# Patient Record
Sex: MALE | Race: OTHER | NOT HISPANIC OR LATINO | Employment: OTHER | URBAN - METROPOLITAN AREA
[De-identification: names, ages, dates, MRNs, and addresses within clinical notes are randomized per-mention and may not be internally consistent; named-entity substitution may affect disease eponyms.]

---

## 2023-11-02 ENCOUNTER — OFFICE VISIT (OUTPATIENT)
Dept: OPHTHALMOLOGY | Facility: CLINIC | Age: 83
End: 2023-11-02
Payer: COMMERCIAL

## 2023-11-02 DIAGNOSIS — Z96.1 PSEUDOPHAKIA OF BOTH EYES: ICD-10-CM

## 2023-11-02 DIAGNOSIS — H43.813 POSTERIOR VITREOUS DETACHMENT OF BOTH EYES: ICD-10-CM

## 2023-11-02 DIAGNOSIS — H52.4 PRESBYOPIA: ICD-10-CM

## 2023-11-02 DIAGNOSIS — H52.223 REGULAR ASTIGMATISM OF BOTH EYES: ICD-10-CM

## 2023-11-02 DIAGNOSIS — H26.493 PCO (POSTERIOR CAPSULAR OPACIFICATION), BILATERAL: ICD-10-CM

## 2023-11-02 DIAGNOSIS — H52.13 MYOPIA OF BOTH EYES: Primary | ICD-10-CM

## 2023-11-02 PROCEDURE — 92015 DETERMINE REFRACTIVE STATE: CPT | Performed by: OPTOMETRIST

## 2023-11-02 PROCEDURE — 92004 COMPRE OPH EXAM NEW PT 1/>: CPT | Performed by: OPTOMETRIST

## 2023-11-02 RX ORDER — ATENOLOL 50 MG/1
TABLET ORAL
COMMUNITY
Start: 2023-01-23

## 2023-11-02 RX ORDER — LOSARTAN POTASSIUM 100 MG/1
TABLET ORAL
COMMUNITY
Start: 2023-01-23

## 2023-11-02 RX ORDER — APIXABAN 5 MG/1
TABLET, FILM COATED ORAL
COMMUNITY
Start: 2023-01-23

## 2023-11-02 RX ORDER — SIMVASTATIN 40 MG/1
TABLET, FILM COATED ORAL
COMMUNITY
Start: 2023-05-01

## 2023-11-02 RX ORDER — AMLODIPINE BESYLATE 5 MG/1
TABLET ORAL
COMMUNITY
Start: 2023-01-23

## 2023-11-02 ASSESSMENT — CUP TO DISC RATIO
OD_RATIO: 0.4
OS_RATIO: 0.5

## 2023-11-02 ASSESSMENT — REFRACTION_MANIFEST
OS_CYLINDER: -2.00
OS_SPHERE: -3.50
OD_CYLINDER: -2.25
OS_SPHERE: -3.25
OD_AXIS: 092
OD_SPHERE: -1.50
OD_AXIS: 087
OS_ADD: +2.50
OD_CYLINDER: -2.75
OD_SPHERE: -1.75
OD_ADD: +2.50
METHOD_AUTOREFRACTION: 1
OS_AXIS: 075
OS_AXIS: 066
OS_CYLINDER: -1.25

## 2023-11-02 ASSESSMENT — CONF VISUAL FIELD
OS_INFERIOR_NASAL_RESTRICTION: 0
OS_SUPERIOR_NASAL_RESTRICTION: 0
OD_INFERIOR_NASAL_RESTRICTION: 0
OS_INFERIOR_TEMPORAL_RESTRICTION: 0
OS_SUPERIOR_TEMPORAL_RESTRICTION: 0
METHOD: COUNTING FINGERS
OD_SUPERIOR_NASAL_RESTRICTION: 0
OD_INFERIOR_TEMPORAL_RESTRICTION: 0
OD_SUPERIOR_TEMPORAL_RESTRICTION: 0
OS_NORMAL: 1
OD_NORMAL: 1

## 2023-11-02 ASSESSMENT — ENCOUNTER SYMPTOMS
HEMATOLOGIC/LYMPHATIC NEGATIVE: 0
ENDOCRINE NEGATIVE: 0
CARDIOVASCULAR NEGATIVE: 0
EYES NEGATIVE: 1
MUSCULOSKELETAL NEGATIVE: 0
PSYCHIATRIC NEGATIVE: 0
ALLERGIC/IMMUNOLOGIC NEGATIVE: 0
GASTROINTESTINAL NEGATIVE: 0
CONSTITUTIONAL NEGATIVE: 0
RESPIRATORY NEGATIVE: 0
NEUROLOGICAL NEGATIVE: 0

## 2023-11-02 ASSESSMENT — TONOMETRY
OS_IOP_MMHG: 11
IOP_METHOD: GOLDMANN APPLANATION
OD_IOP_MMHG: 11

## 2023-11-02 ASSESSMENT — VISUAL ACUITY
OS_CC+: -2
OD_PH_CC+: -2
OD_PH_CC: 20/30
OS_CC: 20/30
OS_PH_CC: 20/25
OS_PH_CC+: -2
OD_CC: 20/50
CORRECTION_TYPE: GLASSES
OD_CC+: +1
METHOD: SNELLEN - LINEAR

## 2023-11-02 ASSESSMENT — EXTERNAL EXAM - LEFT EYE: OS_EXAM: NORMAL

## 2023-11-02 ASSESSMENT — EXTERNAL EXAM - RIGHT EYE: OD_EXAM: NORMAL

## 2023-11-02 ASSESSMENT — SLIT LAMP EXAM - LIDS
COMMENTS: NORMAL
COMMENTS: NORMAL

## 2023-11-02 ASSESSMENT — REFRACTION_WEARINGRX
OS_AXIS: 065
OS_CYLINDER: -1.25
SPECS_TYPE: SVL
OD_SPHERE: -2.00
OD_AXIS: 085
OS_SPHERE: -3.75
OD_CYLINDER: -2.50

## 2023-11-02 NOTE — PROGRESS NOTES
Posterior chamber intraocular lens (PCIOL) both eyes (OU). PCO right eye (OD) and significant PCO right eye (OD). Recommend YAG laser right eye (OD) as patient is symptomatic.  Myopic fundus and PVD both eyes (OU) and The patient was asked to return to our clinic in one year or sooner if ocular or vision changes occur.

## 2023-12-11 ENCOUNTER — OFFICE VISIT (OUTPATIENT)
Dept: OPHTHALMOLOGY | Facility: CLINIC | Age: 83
End: 2023-12-11
Payer: COMMERCIAL

## 2023-12-11 DIAGNOSIS — H26.493 PCO (POSTERIOR CAPSULAR OPACIFICATION), BILATERAL: ICD-10-CM

## 2023-12-11 DIAGNOSIS — H20.00 ACUTE IRITIS: Primary | ICD-10-CM

## 2023-12-11 PROCEDURE — 99214 OFFICE O/P EST MOD 30 MIN: CPT | Performed by: OPHTHALMOLOGY

## 2023-12-11 RX ORDER — PREDNISOLONE ACETATE 10 MG/ML
1 SUSPENSION/ DROPS OPHTHALMIC 4 TIMES DAILY
Qty: 5 ML | Refills: 0 | Status: SHIPPED | OUTPATIENT
Start: 2023-12-11 | End: 2023-12-29 | Stop reason: SDUPTHER

## 2023-12-11 ASSESSMENT — TONOMETRY
IOP_METHOD: GOLDMANN APPLANATION
OS_IOP_MMHG: 10
OD_IOP_MMHG: 10

## 2023-12-11 ASSESSMENT — ENCOUNTER SYMPTOMS
NEUROLOGICAL NEGATIVE: 0
ENDOCRINE NEGATIVE: 0
HEMATOLOGIC/LYMPHATIC NEGATIVE: 0
CARDIOVASCULAR NEGATIVE: 0
MUSCULOSKELETAL NEGATIVE: 0
ALLERGIC/IMMUNOLOGIC NEGATIVE: 0
EYES NEGATIVE: 0
PSYCHIATRIC NEGATIVE: 0
RESPIRATORY NEGATIVE: 0
GASTROINTESTINAL NEGATIVE: 0
CONSTITUTIONAL NEGATIVE: 0

## 2023-12-11 ASSESSMENT — VISUAL ACUITY
OD_CC: 20/50
OS_CC: 20/25
CORRECTION_TYPE: GLASSES
OD_CC+: +2
OS_CC+: -2
METHOD: SNELLEN - LINEAR

## 2023-12-11 ASSESSMENT — EXTERNAL EXAM - LEFT EYE: OS_EXAM: NORMAL

## 2023-12-11 ASSESSMENT — EXTERNAL EXAM - RIGHT EYE: OD_EXAM: NORMAL

## 2023-12-11 ASSESSMENT — CUP TO DISC RATIO
OS_RATIO: 0.5
OD_RATIO: 0.4

## 2023-12-11 ASSESSMENT — SLIT LAMP EXAM - LIDS
COMMENTS: NORMAL
COMMENTS: NORMAL

## 2023-12-11 NOTE — PROGRESS NOTES
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 Aldana 1/2015. 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 Reviewed patient's history/records from Dr. Charles. Patient presented with Posterior subcapsular cataract Cataract Right eye and at no point has had a significant Intraocular pressure elevation making Andriy-Schlossman Syndrome or herpetic uveitis less likely. Gonio today demonstrates vessels bridging the angle consistent with FHI as was Posterior subcapsular cataract at presentation, generally mild 30 year history. Patient has been on FML for decades per Dr. Charles. Indicated to patient will continue with pulsed prednisolone drops four times a day Right eye and re-evaluate in 1 month. Indicated to patient may not continue FML indefinitely as iritis can linger in FHI and may not be entirely modified by/warrrant long-term FML use. Should patients uveitis picture evolve would consider labs/working up alternative possibilities, but presently FHI seems most probable despite the absence of heterochromia.\"     I will start the pt on pred qid and will refer to Dr. Calixto for assessment  -     prednisoLONE acetate (Pred-Forte) 1 % ophthalmic suspension; Administer 1 drop into the right eye 4 times a day.  PCO (posterior capsular opacification), bilateral  Defer YAG OD until cleared by Dr. Calixto  "

## 2023-12-29 DIAGNOSIS — H20.00 ACUTE IRITIS: ICD-10-CM

## 2023-12-29 RX ORDER — PREDNISOLONE ACETATE 10 MG/ML
1 SUSPENSION/ DROPS OPHTHALMIC 4 TIMES DAILY
Qty: 5 ML | Refills: 0 | Status: SHIPPED | OUTPATIENT
Start: 2023-12-29 | End: 2024-01-28

## 2024-02-19 ENCOUNTER — OFFICE VISIT (OUTPATIENT)
Dept: OPHTHALMOLOGY | Facility: CLINIC | Age: 84
End: 2024-02-19
Payer: COMMERCIAL

## 2024-02-19 ENCOUNTER — LAB (OUTPATIENT)
Dept: LAB | Facility: LAB | Age: 84
End: 2024-02-19
Payer: COMMERCIAL

## 2024-02-19 DIAGNOSIS — H20.9 IRIDOCYCLITIS: ICD-10-CM

## 2024-02-19 DIAGNOSIS — H52.13 MYOPIA OF BOTH EYES: ICD-10-CM

## 2024-02-19 DIAGNOSIS — H30.21 INTERMEDIATE UVEITIS OF RIGHT EYE: Primary | ICD-10-CM

## 2024-02-19 DIAGNOSIS — H30.21 INTERMEDIATE UVEITIS OF RIGHT EYE: ICD-10-CM

## 2024-02-19 PROBLEM — H30.93 BILATERAL POSTERIOR UVEITIS: Status: ACTIVE | Noted: 2024-02-19

## 2024-02-19 LAB
BASOPHILS # BLD AUTO: 0.05 X10*3/UL (ref 0–0.1)
BASOPHILS NFR BLD AUTO: 0.6 %
EOSINOPHIL # BLD AUTO: 0.2 X10*3/UL (ref 0–0.4)
EOSINOPHIL NFR BLD AUTO: 2.6 %
ERYTHROCYTE [DISTWIDTH] IN BLOOD BY AUTOMATED COUNT: 13.3 % (ref 11.5–14.5)
HCT VFR BLD AUTO: 41.9 % (ref 41–52)
HGB BLD-MCNC: 13.9 G/DL (ref 13.5–17.5)
IMM GRANULOCYTES # BLD AUTO: 0.02 X10*3/UL (ref 0–0.5)
IMM GRANULOCYTES NFR BLD AUTO: 0.3 % (ref 0–0.9)
LYMPHOCYTES # BLD AUTO: 1.71 X10*3/UL (ref 0.8–3)
LYMPHOCYTES NFR BLD AUTO: 22 %
MCH RBC QN AUTO: 30.5 PG (ref 26–34)
MCHC RBC AUTO-ENTMCNC: 33.2 G/DL (ref 32–36)
MCV RBC AUTO: 92 FL (ref 80–100)
MONOCYTES # BLD AUTO: 0.82 X10*3/UL (ref 0.05–0.8)
MONOCYTES NFR BLD AUTO: 10.6 %
NEUTROPHILS # BLD AUTO: 4.96 X10*3/UL (ref 1.6–5.5)
NEUTROPHILS NFR BLD AUTO: 63.9 %
NRBC BLD-RTO: 0 /100 WBCS (ref 0–0)
PLATELET # BLD AUTO: 150 X10*3/UL (ref 150–450)
RBC # BLD AUTO: 4.56 X10*6/UL (ref 4.5–5.9)
WBC # BLD AUTO: 7.8 X10*3/UL (ref 4.4–11.3)

## 2024-02-19 PROCEDURE — 83516 IMMUNOASSAY NONANTIBODY: CPT

## 2024-02-19 PROCEDURE — 99214 OFFICE O/P EST MOD 30 MIN: CPT | Performed by: OPHTHALMOLOGY

## 2024-02-19 PROCEDURE — 81381 HLA I TYPING 1 ALLELE HR: CPT

## 2024-02-19 PROCEDURE — 36415 COLL VENOUS BLD VENIPUNCTURE: CPT

## 2024-02-19 PROCEDURE — 86695 HERPES SIMPLEX TYPE 1 TEST: CPT

## 2024-02-19 PROCEDURE — 85025 COMPLETE CBC W/AUTO DIFF WBC: CPT

## 2024-02-19 PROCEDURE — 86036 ANCA SCREEN EACH ANTIBODY: CPT

## 2024-02-19 PROCEDURE — 86696 HERPES SIMPLEX TYPE 2 TEST: CPT

## 2024-02-19 PROCEDURE — 86481 TB AG RESPONSE T-CELL SUSP: CPT

## 2024-02-19 PROCEDURE — 92134 CPTRZ OPH DX IMG PST SGM RTA: CPT | Performed by: OPHTHALMOLOGY

## 2024-02-19 PROCEDURE — 82164 ANGIOTENSIN I ENZYME TEST: CPT

## 2024-02-19 PROCEDURE — 86038 ANTINUCLEAR ANTIBODIES: CPT

## 2024-02-19 RX ORDER — PREDNISOLONE ACETATE 10 MG/ML
1 SUSPENSION/ DROPS OPHTHALMIC 3 TIMES DAILY
Qty: 5 ML | Refills: 3 | Status: SHIPPED | OUTPATIENT
Start: 2024-02-19 | End: 2024-03-20

## 2024-02-19 RX ORDER — VALACYCLOVIR HYDROCHLORIDE 500 MG/1
500 TABLET, FILM COATED ORAL 2 TIMES DAILY
Qty: 14 TABLET | Refills: 0 | Status: SHIPPED | OUTPATIENT
Start: 2024-02-19 | End: 2024-02-26

## 2024-02-19 ASSESSMENT — CONF VISUAL FIELD
OS_SUPERIOR_TEMPORAL_RESTRICTION: 0
OD_SUPERIOR_TEMPORAL_RESTRICTION: 0
OS_INFERIOR_NASAL_RESTRICTION: 0
OD_SUPERIOR_NASAL_RESTRICTION: 0
OD_NORMAL: 1
OS_INFERIOR_TEMPORAL_RESTRICTION: 0
OD_INFERIOR_NASAL_RESTRICTION: 0
OD_INFERIOR_TEMPORAL_RESTRICTION: 0
OS_NORMAL: 1
OS_SUPERIOR_NASAL_RESTRICTION: 0

## 2024-02-19 ASSESSMENT — SLIT LAMP EXAM - LIDS
COMMENTS: NORMAL
COMMENTS: NORMAL

## 2024-02-19 ASSESSMENT — TONOMETRY
OS_IOP_MMHG: 14
IOP_METHOD: GOLDMANN APPLANATION
OD_IOP_MMHG: 14

## 2024-02-19 ASSESSMENT — CUP TO DISC RATIO
OD_RATIO: 0.4
OS_RATIO: 0.5

## 2024-02-19 ASSESSMENT — ENCOUNTER SYMPTOMS
EYES NEGATIVE: 1
CARDIOVASCULAR NEGATIVE: 1

## 2024-02-19 ASSESSMENT — VISUAL ACUITY
OD_CC: 20/30
METHOD: SNELLEN - LINEAR

## 2024-02-19 ASSESSMENT — EXTERNAL EXAM - LEFT EYE: OS_EXAM: NORMAL

## 2024-02-19 ASSESSMENT — EXTERNAL EXAM - RIGHT EYE: OD_EXAM: NORMAL

## 2024-02-19 NOTE — PROGRESS NOTES
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 Aldana 1/2015. 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 Reviewed patient's history/records from Dr. Chrales. Patient presented with Posterior subcapsular cataract Cataract Right eye and at no point has had a significant Intraocular pressure elevation making Andriy-Schlossman Syndrome or herpetic uveitis less likely. Gonio today demonstrates vessels bridging the angle consistent with FHI as was Posterior subcapsular cataract at presentation, generally mild 30 year history. Patient has been on FML for decades per Dr. Charles. Indicated to patient will continue with pulsed prednisolone drops four times a day Right eye and re-evaluate in 1 month. Indicated to patient may not continue FML indefinitely as iritis can linger in FHI and may not be entirely modified by/warrrant long-term FML use. Should patients uveitis picture evolve would consider labs/working up alternative possibilities, but presently FHI seems most probable despite the absence of heterochromia.\"     I will start the pt on pred qid and will refer to Dr. Calixto for assessment  -     prednisoLONE acetate (Pred-Forte) 1 % ophthalmic suspension; Administer 1 drop into the right eye 4 times a day.  PCO (posterior capsular opacification), bilateral  Defer YAG OD until cleared by Dr. Calixto      HISTORY a above      Assessment/Plan   Diagnoses and all orders for this visit:  Iridocyclitis  -     OCT, Retina - OU - Both Eyes  Myopia of both eyes  Intermediate uveitis of right eye  Active right eye      Suspect viral component    Start Ant Viral also Valtrex 500  BID  PF tid  "

## 2024-02-20 LAB
ANA SER QL HEP2 SUBST: NEGATIVE
HERPES SIMPLEX VIRUS 1 IGG: 5.4 INDEX
HERPES SIMPLEX VIRUS 2 IGG: <0.2 INDEX

## 2024-02-21 LAB
ACE SERPL-CCNC: 35 U/L (ref 16–85)
NIL(NEG) CONTROL SPOT COUNT: NORMAL
PANEL A SPOT COUNT: 0
PANEL B SPOT COUNT: 0
POS CONTROL SPOT COUNT: NORMAL
T-SPOT. TB INTERPRETATION: NEGATIVE

## 2024-02-22 LAB
ANCA AB PATTERN SER IF-IMP: NORMAL
ANCA IGG TITR SER IF: NORMAL {TITER}
MYELOPEROXIDASE AB SER-ACNC: 0 AU/ML (ref 0–19)
PROTEINASE3 AB SER-ACNC: 1 AU/ML (ref 0–19)

## 2024-02-26 LAB — HLAB27 TYPING: NEGATIVE

## 2024-03-06 ENCOUNTER — HOSPITAL ENCOUNTER (OUTPATIENT)
Dept: RADIOLOGY | Facility: CLINIC | Age: 84
Discharge: HOME | End: 2024-03-06
Payer: COMMERCIAL

## 2024-03-06 DIAGNOSIS — H30.21 INTERMEDIATE UVEITIS OF RIGHT EYE: ICD-10-CM

## 2024-03-06 PROCEDURE — 71046 X-RAY EXAM CHEST 2 VIEWS: CPT | Performed by: STUDENT IN AN ORGANIZED HEALTH CARE EDUCATION/TRAINING PROGRAM

## 2024-03-06 PROCEDURE — 71046 X-RAY EXAM CHEST 2 VIEWS: CPT

## 2024-04-15 ENCOUNTER — OFFICE VISIT (OUTPATIENT)
Dept: OPHTHALMOLOGY | Facility: CLINIC | Age: 84
End: 2024-04-15
Payer: COMMERCIAL

## 2024-04-15 DIAGNOSIS — Z96.1 PSEUDOPHAKIA OF BOTH EYES: ICD-10-CM

## 2024-04-15 DIAGNOSIS — H30.21 INTERMEDIATE UVEITIS OF RIGHT EYE: ICD-10-CM

## 2024-04-15 DIAGNOSIS — H52.13 MYOPIA OF BOTH EYES: ICD-10-CM

## 2024-04-15 DIAGNOSIS — H30.93 BILATERAL POSTERIOR UVEITIS: Primary | ICD-10-CM

## 2024-04-15 DIAGNOSIS — H43.813 POSTERIOR VITREOUS DETACHMENT OF BOTH EYES: ICD-10-CM

## 2024-04-15 PROCEDURE — 99213 OFFICE O/P EST LOW 20 MIN: CPT | Performed by: OPHTHALMOLOGY

## 2024-04-15 PROCEDURE — 92134 CPTRZ OPH DX IMG PST SGM RTA: CPT | Mod: BILATERAL PROCEDURE | Performed by: OPHTHALMOLOGY

## 2024-04-15 ASSESSMENT — TONOMETRY
OD_IOP_MMHG: 16
IOP_METHOD: GOLDMANN APPLANATION
OS_IOP_MMHG: 16

## 2024-04-15 ASSESSMENT — CONF VISUAL FIELD
OD_NORMAL: 1
OS_NORMAL: 1
OD_SUPERIOR_TEMPORAL_RESTRICTION: 0
OS_INFERIOR_NASAL_RESTRICTION: 0
OS_INFERIOR_TEMPORAL_RESTRICTION: 0
OS_SUPERIOR_NASAL_RESTRICTION: 0
OD_INFERIOR_TEMPORAL_RESTRICTION: 0
OS_SUPERIOR_TEMPORAL_RESTRICTION: 0
OD_INFERIOR_NASAL_RESTRICTION: 0
OD_SUPERIOR_NASAL_RESTRICTION: 0

## 2024-04-15 ASSESSMENT — ENCOUNTER SYMPTOMS: EYES NEGATIVE: 1

## 2024-04-15 ASSESSMENT — VISUAL ACUITY
METHOD: SNELLEN - LINEAR
OS_CC: 20/20
OD_CC: 20/30-2

## 2024-04-15 NOTE — PROGRESS NOTES
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  Last seen at [facility] by Dr. Aldana 1/2015. 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 Reviewed patient's history/records from Dr. Charles. Patient presented with Posterior subcapsular cataract Cataract Right eye and at no point has had a significant Intraocular pressure elevation making Andriy-Schlossman Syndrome or herpetic uveitis less likely. Gonio today demonstrates vessels bridging the angle consistent with FHI as was Posterior subcapsular cataract at presentation, generally mild 30 year history. Patient has been on FML for decades per Dr. Charles. Indicated to patient will continue with pulsed prednisolone drops four times a day Right eye and re-evaluate in 1 month. Indicated to patient may not continue FML indefinitely as iritis can linger in FHI and may not be entirely modified by/warrrant long-term FML use. Should patients uveitis picture evolve would consider labs/working up alternative possibilities, but presently FHI seems most probable despite the absence of heterochromia.\"     I will start the pt on pred qid and will refer to Dr. Calixto for assessment  -     prednisoLONE acetate (Pred-Forte) 1 % ophthalmic suspension; Administer 1 drop into the right eye 4 times a day.  PCO (posterior capsular opacification), bilateral  Defer YAG OD until cleared by Dr. Calixto      HISTORY a above      Assessment/Plan   Diagnoses and all orders for this visit:  Bilateral " posterior uveitis  -     OCT, Retina - OU - Both Eyes  Active right eye      Suspect viral component    Good response to anti Virals Valtrex for 7 days

## 2024-07-15 ENCOUNTER — APPOINTMENT (OUTPATIENT)
Dept: OPHTHALMOLOGY | Facility: CLINIC | Age: 84
End: 2024-07-15
Payer: COMMERCIAL

## 2024-07-15 DIAGNOSIS — H35.051 RETINAL NEOVASCULARIZATION OF RIGHT EYE: ICD-10-CM

## 2024-07-15 DIAGNOSIS — H30.21 INTERMEDIATE UVEITIS OF RIGHT EYE: Primary | ICD-10-CM

## 2024-07-15 PROBLEM — H35.052: Status: ACTIVE | Noted: 2024-07-15

## 2024-07-15 PROCEDURE — 99214 OFFICE O/P EST MOD 30 MIN: CPT | Performed by: OPHTHALMOLOGY

## 2024-07-15 PROCEDURE — 92134 CPTRZ OPH DX IMG PST SGM RTA: CPT | Mod: BILATERAL PROCEDURE | Performed by: OPHTHALMOLOGY

## 2024-07-15 ASSESSMENT — ENCOUNTER SYMPTOMS
RESPIRATORY NEGATIVE: 0
NEUROLOGICAL NEGATIVE: 0
PSYCHIATRIC NEGATIVE: 0
GASTROINTESTINAL NEGATIVE: 0
MUSCULOSKELETAL NEGATIVE: 0
ALLERGIC/IMMUNOLOGIC NEGATIVE: 0
HEMATOLOGIC/LYMPHATIC NEGATIVE: 0
EYES NEGATIVE: 1
CONSTITUTIONAL NEGATIVE: 0
ENDOCRINE NEGATIVE: 0
CARDIOVASCULAR NEGATIVE: 0

## 2024-07-15 ASSESSMENT — REFRACTION_WEARINGRX
OD_CYLINDER: -2.75
OS_CYLINDER: -1.25
OS_SPHERE: -3.50
OS_ADD: +2.50
OD_AXIS: 087
OS_AXIS: 075
OD_ADD: +2.50
OD_SPHERE: -1.75

## 2024-07-15 ASSESSMENT — CONF VISUAL FIELD
OD_INFERIOR_NASAL_RESTRICTION: 0
OD_NORMAL: 1
OS_INFERIOR_NASAL_RESTRICTION: 0
OD_SUPERIOR_NASAL_RESTRICTION: 0
OD_SUPERIOR_TEMPORAL_RESTRICTION: 0
OD_INFERIOR_TEMPORAL_RESTRICTION: 0
OS_INFERIOR_TEMPORAL_RESTRICTION: 0
OS_SUPERIOR_TEMPORAL_RESTRICTION: 0
OS_SUPERIOR_NASAL_RESTRICTION: 0
OS_NORMAL: 1

## 2024-07-15 ASSESSMENT — EXTERNAL EXAM - RIGHT EYE: OD_EXAM: NORMAL

## 2024-07-15 ASSESSMENT — VISUAL ACUITY
CORRECTION_TYPE: GLASSES
OD_CC: 20/30
METHOD: SNELLEN - LINEAR
OS_CC: 20/25

## 2024-07-15 ASSESSMENT — SLIT LAMP EXAM - LIDS
COMMENTS: NORMAL
COMMENTS: NORMAL

## 2024-07-15 ASSESSMENT — EXTERNAL EXAM - LEFT EYE: OS_EXAM: NORMAL

## 2024-07-15 ASSESSMENT — CUP TO DISC RATIO
OS_RATIO: 0.5
OD_RATIO: 0.4

## 2024-07-15 ASSESSMENT — TONOMETRY
OS_IOP_MMHG: 16
OD_IOP_MMHG: 15
IOP_METHOD: GOLDMANN APPLANATION

## 2024-07-15 NOTE — PROGRESS NOTES
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 Aldana 1/2015. 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 Reviewed patient's history/records from Dr. Charles. Patient presented with Posterior subcapsular cataract Cataract Right eye and at no point has had a significant Intraocular pressure elevation making Andriy-Schlossman Syndrome or herpetic uveitis less likely. Gonio today demonstrates vessels bridging the angle consistent with FHI as was Posterior subcapsular cataract at presentation, generally mild 30 year history. Patient has been on FML for decades per Dr. Charles. Indicated to patient will continue with pulsed prednisolone drops four times a day Right eye and re-evaluate in 1 month. Indicated to patient may not continue FML indefinitely as iritis can linger in FHI and may not be entirely modified by/warrrant long-term FML use. Should patients uveitis picture evolve would consider labs/working up alternative possibilities, but presently FHI seems most probable despite the absence of heterochromia.\"     I will start the pt on pred qid and will refer to Dr. Calixto for assessment  -     prednisoLONE acetate (Pred-Forte) 1 % ophthalmic suspension; Administer 1 drop into the right eye 4 times a day.  PCO (posterior capsular opacification), bilateral  Defer YAG OD until cleared by Dr. 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  HISTORY a above      Assessment/Plan   Diagnoses and all orders for this visit:  Intermediate uveitis of right eye  -     OCT, Retina - OU - Both " Eyes  Active right eye with keratitic precipitates (KPs), tr cell, 1+ anterior vitreous cells    Suspect viral component     PF every other day restart has mild flare    DIAGNOSTIC PROCEDURE DONE    OCT DONE OD/OS            REASON FOR TEST: will help address and tailor  therapy by detecting subclinical CME SRF     Hi quality OCT  scans obtained  signal good    OCT OD - Normal Foveal Contour, No Edema, IS/OS Junction Normal  OCT OS - Normal Foveal Contour, No Edema, IS/OS Junction Normal    additional commnents:    Fu 2 months

## 2024-09-16 ENCOUNTER — APPOINTMENT (OUTPATIENT)
Dept: OPHTHALMOLOGY | Facility: CLINIC | Age: 84
End: 2024-09-16
Payer: COMMERCIAL

## 2024-09-16 DIAGNOSIS — H52.223 REGULAR ASTIGMATISM OF BOTH EYES: ICD-10-CM

## 2024-09-16 DIAGNOSIS — H52.13 MYOPIA OF BOTH EYES: ICD-10-CM

## 2024-09-16 DIAGNOSIS — H35.051 RETINAL NEOVASCULARIZATION OF RIGHT EYE: Primary | ICD-10-CM

## 2024-09-16 DIAGNOSIS — H43.813 POSTERIOR VITREOUS DETACHMENT OF BOTH EYES: ICD-10-CM

## 2024-09-16 DIAGNOSIS — H30.21 INTERMEDIATE UVEITIS OF RIGHT EYE: ICD-10-CM

## 2024-09-16 DIAGNOSIS — H52.4 PRESBYOPIA: ICD-10-CM

## 2024-09-16 PROCEDURE — 99214 OFFICE O/P EST MOD 30 MIN: CPT | Performed by: OPHTHALMOLOGY

## 2024-09-16 PROCEDURE — 92134 CPTRZ OPH DX IMG PST SGM RTA: CPT | Performed by: OPHTHALMOLOGY

## 2024-09-16 ASSESSMENT — CONF VISUAL FIELD
OS_SUPERIOR_TEMPORAL_RESTRICTION: 0
OD_INFERIOR_TEMPORAL_RESTRICTION: 0
OS_INFERIOR_NASAL_RESTRICTION: 0
OS_INFERIOR_TEMPORAL_RESTRICTION: 0
OD_SUPERIOR_TEMPORAL_RESTRICTION: 0
OD_NORMAL: 1
OS_SUPERIOR_NASAL_RESTRICTION: 0
METHOD: COUNTING FINGERS
OD_INFERIOR_NASAL_RESTRICTION: 0
OD_SUPERIOR_NASAL_RESTRICTION: 0
OS_NORMAL: 1

## 2024-09-16 ASSESSMENT — TONOMETRY
OD_IOP_MMHG: 12
OS_IOP_MMHG: 12
IOP_METHOD: TONOPEN

## 2024-09-16 ASSESSMENT — REFRACTION_WEARINGRX
OD_SPHERE: -1.75
OD_AXIS: 087
OD_CYLINDER: -2.75
OS_AXIS: 075
OD_ADD: +2.50
OS_CYLINDER: -1.25
OS_ADD: +2.50
OS_SPHERE: -3.50
SPECS_TYPE: SVL

## 2024-09-16 ASSESSMENT — SLIT LAMP EXAM - LIDS
COMMENTS: NORMAL
COMMENTS: NORMAL

## 2024-09-16 ASSESSMENT — ENCOUNTER SYMPTOMS: HEMATOLOGIC/LYMPHATIC NEGATIVE: 1

## 2024-09-16 ASSESSMENT — EXTERNAL EXAM - RIGHT EYE: OD_EXAM: NORMAL

## 2024-09-16 ASSESSMENT — VISUAL ACUITY
OD_CC: 20/40
CORRECTION_TYPE: GLASSES
OS_CC: 20/20
METHOD: SNELLEN - LINEAR
OD_CC+: +2
OS_CC+: -1

## 2024-09-16 ASSESSMENT — CUP TO DISC RATIO
OS_RATIO: 0.5
OD_RATIO: 0.4

## 2024-09-16 ASSESSMENT — EXTERNAL EXAM - LEFT EYE: OS_EXAM: NORMAL

## 2024-09-16 NOTE — PROGRESS NOTES
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 Aldana 1/2015. 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 Reviewed patient's history/records from Dr. Charles. Patient presented with Posterior subcapsular cataract Cataract Right eye and at no point has had a significant Intraocular pressure elevation making Andriy-Schlossman Syndrome or herpetic uveitis less likely. Gonio today demonstrates vessels bridging the angle consistent with FHI as was Posterior subcapsular cataract at presentation, generally mild 30 year history. Patient has been on FML for decades per Dr. Charles. Indicated to patient will continue with pulsed prednisolone drops four times a day Right eye and re-evaluate in 1 month. Indicated to patient may not continue FML indefinitely as iritis can linger in FHI and may not be entirely modified by/warrrant long-term FML use. Should patients uveitis picture evolve would consider labs/working up alternative possibilities, but presently FHI seems most probable despite the absence of heterochromia.\"     I will start the pt on pred qid and will refer to Dr. Calixto for assessment  -     prednisoLONE acetate (Pred-Forte) 1 % ophthalmic suspension; Administer 1 drop into the right eye 4 times a day.  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  HISTORY a above      Assessment/Plan   Diagnoses and all orders for this " visit:  Retinal neovascularization of right eye  -     OCT, Retina - OU - Both Eyes  Active right eye with keratitic precipitates (KPs), tr cell, 1+ anterior vitreous cells    Suspect viral component     PF every other day restart has mild flare    DIAGNOSTIC PROCEDURE DONE    OCT DONE OD/OS            REASON FOR TEST: will help address and tailor  therapy by detecting subclinical CME SRF     Hi quality OCT  scans obtained  signal good    OCT OD - Normal Foveal Contour, No Edema, IS/OS Junction Normal  OCT OS - Normal Foveal Contour, No Edema, IS/OS Junction Normal    additional commnents:    PF every other day rht    Fu  4 months

## 2025-01-29 ENCOUNTER — APPOINTMENT (OUTPATIENT)
Dept: OPHTHALMOLOGY | Facility: CLINIC | Age: 85
End: 2025-01-29
Payer: COMMERCIAL

## 2025-01-29 DIAGNOSIS — H35.051 RETINAL NEOVASCULARIZATION OF RIGHT EYE: Primary | ICD-10-CM

## 2025-01-29 PROCEDURE — 99213 OFFICE O/P EST LOW 20 MIN: CPT | Performed by: OPHTHALMOLOGY

## 2025-01-29 PROCEDURE — 92134 CPTRZ OPH DX IMG PST SGM RTA: CPT | Mod: BILATERAL PROCEDURE | Performed by: OPHTHALMOLOGY

## 2025-01-29 ASSESSMENT — ENCOUNTER SYMPTOMS
MUSCULOSKELETAL NEGATIVE: 0
NEUROLOGICAL NEGATIVE: 0
GASTROINTESTINAL NEGATIVE: 0
CARDIOVASCULAR NEGATIVE: 0
ALLERGIC/IMMUNOLOGIC NEGATIVE: 0
HEMATOLOGIC/LYMPHATIC NEGATIVE: 0
RESPIRATORY NEGATIVE: 0
CONSTITUTIONAL NEGATIVE: 0
PSYCHIATRIC NEGATIVE: 0
ENDOCRINE NEGATIVE: 0
EYES NEGATIVE: 1

## 2025-01-29 ASSESSMENT — VISUAL ACUITY
METHOD: SNELLEN - LINEAR
CORRECTION_TYPE: GLASSES
OS_CC: 20/20
OD_CC: 20/30

## 2025-01-29 ASSESSMENT — CUP TO DISC RATIO
OD_RATIO: 0.4
OS_RATIO: 0.5

## 2025-01-29 ASSESSMENT — TONOMETRY
OS_IOP_MMHG: 13
OD_IOP_MMHG: 13
IOP_METHOD: GOLDMANN APPLANATION

## 2025-01-29 ASSESSMENT — EXTERNAL EXAM - RIGHT EYE: OD_EXAM: NORMAL

## 2025-01-29 ASSESSMENT — EXTERNAL EXAM - LEFT EYE: OS_EXAM: NORMAL

## 2025-01-29 ASSESSMENT — SLIT LAMP EXAM - LIDS
COMMENTS: NORMAL
COMMENTS: NORMAL

## 2025-01-29 NOTE — PROGRESS NOTES
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 Aldana 1/2015. 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 Reviewed patient's history/records from Dr. Charles. Patient presented with Posterior subcapsular cataract Cataract Right eye and at no point has had a significant Intraocular pressure elevation making Andriy-Schlossman Syndrome or herpetic uveitis less likely. Gonio today demonstrates vessels bridging the angle consistent with FHI as was Posterior subcapsular cataract at presentation, generally mild 30 year history. Patient has been on FML for decades per Dr. Charles. Indicated to patient will continue with pulsed prednisolone drops four times a day Right eye and re-evaluate in 1 month. Indicated to patient may not continue FML indefinitely as iritis can linger in FHI and may not be entirely modified by/warrrant long-term FML use. 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

## 2025-07-30 ENCOUNTER — APPOINTMENT (OUTPATIENT)
Dept: OPHTHALMOLOGY | Facility: CLINIC | Age: 85
End: 2025-07-30
Payer: MEDICARE

## 2025-08-04 ENCOUNTER — APPOINTMENT (OUTPATIENT)
Dept: OPHTHALMOLOGY | Facility: CLINIC | Age: 85
End: 2025-08-04
Payer: MEDICARE

## 2025-08-04 DIAGNOSIS — H35.051 RETINAL NEOVASCULARIZATION OF RIGHT EYE: Primary | ICD-10-CM

## 2025-08-04 DIAGNOSIS — H30.21 INTERMEDIATE UVEITIS OF RIGHT EYE: ICD-10-CM

## 2025-08-04 PROCEDURE — 92134 CPTRZ OPH DX IMG PST SGM RTA: CPT | Performed by: OPHTHALMOLOGY

## 2025-08-04 PROCEDURE — 99213 OFFICE O/P EST LOW 20 MIN: CPT | Performed by: OPHTHALMOLOGY

## 2025-08-04 ASSESSMENT — ENCOUNTER SYMPTOMS
RESPIRATORY NEGATIVE: 0
CARDIOVASCULAR NEGATIVE: 0
HEMATOLOGIC/LYMPHATIC NEGATIVE: 0
PSYCHIATRIC NEGATIVE: 0
NEUROLOGICAL NEGATIVE: 0
EYES NEGATIVE: 1
ENDOCRINE NEGATIVE: 0
GASTROINTESTINAL NEGATIVE: 0
CONSTITUTIONAL NEGATIVE: 0
ALLERGIC/IMMUNOLOGIC NEGATIVE: 0
MUSCULOSKELETAL NEGATIVE: 0

## 2025-08-04 ASSESSMENT — EXTERNAL EXAM - LEFT EYE: OS_EXAM: NORMAL

## 2025-08-04 ASSESSMENT — CUP TO DISC RATIO
OD_RATIO: 0.4
OS_RATIO: 0.5

## 2025-08-04 ASSESSMENT — VISUAL ACUITY
OS_CC: 20/20
OD_CC: 20/20-2
METHOD: SNELLEN - LINEAR

## 2025-08-04 ASSESSMENT — TONOMETRY
OD_IOP_MMHG: 12
OS_IOP_MMHG: 12
IOP_METHOD: GOLDMANN APPLANATION

## 2025-08-04 ASSESSMENT — SLIT LAMP EXAM - LIDS
COMMENTS: NORMAL
COMMENTS: NORMAL

## 2025-08-04 ASSESSMENT — EXTERNAL EXAM - RIGHT EYE: OD_EXAM: NORMAL

## 2025-08-04 NOTE — PROGRESS NOTES
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 Aldana 1/2015. 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 Reviewed patient's history/records from Dr. Chrales. Patient presented with Posterior subcapsular cataract Cataract Right eye and at no point has had a significant Intraocular pressure elevation making Andriy-Schlossman Syndrome or herpetic uveitis less likely. Gonio today demonstrates vessels bridging the angle consistent with FHI as was Posterior subcapsular cataract at presentation, generally mild 30 year history. Patient has been on FML for decades per Dr. Charles. Indicated to patient will continue with pulsed prednisolone drops four times a day Right eye and re-evaluate in 1 month. Indicated to patient may not continue FML indefinitely as iritis can linger in FHI and may not be entirely modified by/warrrant long-term FML use. 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

## 2026-02-16 ENCOUNTER — APPOINTMENT (OUTPATIENT)
Dept: OPHTHALMOLOGY | Facility: CLINIC | Age: 86
End: 2026-02-16
Payer: MEDICARE